# Patient Record
Sex: FEMALE | Race: WHITE | NOT HISPANIC OR LATINO | Employment: FULL TIME | URBAN - METROPOLITAN AREA
[De-identification: names, ages, dates, MRNs, and addresses within clinical notes are randomized per-mention and may not be internally consistent; named-entity substitution may affect disease eponyms.]

---

## 2020-04-08 ENCOUNTER — TELEPHONE (OUTPATIENT)
Dept: RHEUMATOLOGY | Facility: CLINIC | Age: 70
End: 2020-04-08

## 2020-04-08 NOTE — TELEPHONE ENCOUNTER
----- Message from Bhakti Edgar sent at 4/8/2020  2:33 PM CDT -----  Contact: Patient  Type: Needs Medical Advice  Who Called:  Patient  Symptoms (please be specific):  lupus  Best Call Back Number:   Additional Information: Would be a new patient, switching providers. Patient has lupus and is wanting to schedule a new patient appt..

## 2020-04-08 NOTE — TELEPHONE ENCOUNTER
Returned patient call regarding new patient appointment. Informed of first new patient appointment. Patient would like to schedule and be added to wait list. Patient aware of date and time of appointment.

## 2020-12-10 ENCOUNTER — TELEPHONE (OUTPATIENT)
Dept: RHEUMATOLOGY | Facility: CLINIC | Age: 70
End: 2020-12-10

## 2020-12-10 NOTE — TELEPHONE ENCOUNTER
----- Message from Alvin Subramanian sent at 12/10/2020  3:52 PM CST -----  Type:  Patient Returning Call    Who Called:  Patient  Who Left Message for Patient:  NA  Does the patient know what this is regarding?: ROMANA  Best Call Back Number: [phone]  Additional Information:

## 2020-12-15 ENCOUNTER — TELEPHONE (OUTPATIENT)
Dept: RHEUMATOLOGY | Facility: CLINIC | Age: 70
End: 2020-12-15

## 2020-12-15 ENCOUNTER — OFFICE VISIT (OUTPATIENT)
Dept: RHEUMATOLOGY | Facility: CLINIC | Age: 70
End: 2020-12-15
Payer: COMMERCIAL

## 2020-12-15 VITALS — WEIGHT: 154 LBS | DIASTOLIC BLOOD PRESSURE: 77 MMHG | SYSTOLIC BLOOD PRESSURE: 115 MMHG | HEART RATE: 90 BPM

## 2020-12-15 DIAGNOSIS — L93.1 SUBACUTE CUTANEOUS LUPUS ERYTHEMATOSUS: ICD-10-CM

## 2020-12-15 DIAGNOSIS — M32.19 SYSTEMIC LUPUS ERYTHEMATOSUS WITH OTHER ORGAN INVOLVEMENT, UNSPECIFIED SLE TYPE: Primary | ICD-10-CM

## 2020-12-15 DIAGNOSIS — A31.0 MYCOBACTERIUM AVIUM-INTRACELLULARE INFECTION: ICD-10-CM

## 2020-12-15 DIAGNOSIS — R06.02 SOB (SHORTNESS OF BREATH): ICD-10-CM

## 2020-12-15 PROCEDURE — 99205 PR OFFICE/OUTPT VISIT, NEW, LEVL V, 60-74 MIN: ICD-10-PCS | Mod: 25,S$GLB,, | Performed by: INTERNAL MEDICINE

## 2020-12-15 PROCEDURE — 1159F MED LIST DOCD IN RCRD: CPT | Mod: S$GLB,,, | Performed by: INTERNAL MEDICINE

## 2020-12-15 PROCEDURE — 99205 OFFICE O/P NEW HI 60 MIN: CPT | Mod: 25,S$GLB,, | Performed by: INTERNAL MEDICINE

## 2020-12-15 PROCEDURE — 99999 PR PBB SHADOW E&M-EST. PATIENT-LVL III: ICD-10-PCS | Mod: PBBFAC,,, | Performed by: INTERNAL MEDICINE

## 2020-12-15 PROCEDURE — 1159F PR MEDICATION LIST DOCUMENTED IN MEDICAL RECORD: ICD-10-PCS | Mod: S$GLB,,, | Performed by: INTERNAL MEDICINE

## 2020-12-15 PROCEDURE — 99999 PR PBB SHADOW E&M-EST. PATIENT-LVL III: CPT | Mod: PBBFAC,,, | Performed by: INTERNAL MEDICINE

## 2020-12-15 RX ORDER — FLUCONAZOLE 150 MG/1
150 TABLET ORAL DAILY
Qty: 3 TABLET | Refills: 3 | Status: SHIPPED | OUTPATIENT
Start: 2020-12-15 | End: 2020-12-18

## 2020-12-15 ASSESSMENT — ROUTINE ASSESSMENT OF PATIENT INDEX DATA (RAPID3)
PATIENT GLOBAL ASSESSMENT SCORE: 5
FATIGUE SCORE: 1.1
TOTAL RAPID3 SCORE: 2.28
PAIN SCORE: 0.5
PSYCHOLOGICAL DISTRESS SCORE: 0
MDHAQ FUNCTION SCORE: 0.4

## 2020-12-15 NOTE — TELEPHONE ENCOUNTER
----- Message from Jewels Levi sent at 12/15/2020  1:04 PM CST -----  Regarding: Switch [facility]  Type: Needs Medical Advice  Who Called:  Pt    Best Call Back Number: [phone]  Additional Information: Patient is requesting a call back in regards to switching lab to [facility] in [place] Please and Thank you!

## 2020-12-15 NOTE — PROGRESS NOTES
Subjective:       Patient ID: Edwina Mcdaniels is a 70 y.o. female.    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    Review of Systems   Constitutional: Positive for fatigue. Negative for activity change, appetite change, chills, diaphoresis, fever and unexpected weight change.   HENT: Negative for congestion, dental problem, ear discharge, ear pain, facial swelling, mouth sores, nosebleeds, postnasal drip, rhinorrhea, sinus pressure, sneezing, sore throat, tinnitus, trouble swallowing and voice change.    Eyes: Negative for photophobia, pain, discharge, redness and itching.   Respiratory: Negative for apnea, cough, chest tightness, shortness of breath and wheezing.    Cardiovascular: Negative for chest pain, palpitations and leg swelling.   Gastrointestinal: Negative for abdominal distention, abdominal pain, constipation, diarrhea, nausea and vomiting.   Endocrine: Negative for cold intolerance, heat intolerance, polydipsia and polyuria.   Genitourinary: Negative for decreased urine volume, difficulty urinating, dysuria, flank pain, frequency, hematuria and urgency.   Musculoskeletal: Negative for back pain, gait problem, joint swelling, myalgias, neck pain and neck stiffness.   Skin: Negative for pallor, rash and wound.   Allergic/Immunologic: Negative for immunocompromised state.   Neurological: Positive for weakness. Negative for dizziness, tremors, numbness and headaches.   Hematological: Negative for adenopathy. Does not bruise/bleed easily.   Psychiatric/Behavioral: Negative for sleep disturbance. The patient is not nervous/anxious.          Objective:   /77 (BP Location: Left arm, Patient Position: Sitting, BP Method: Medium (Automatic))   Pulse 90   Wt 69.9 kg (154 lb)      Physical  Exam   Nursing note and vitals reviewed.  Constitutional: She is oriented to person, place, and time and well-developed, well-nourished, and in no distress. No distress.   HENT:   Head: Normocephalic and atraumatic.   Mouth/Throat: Oropharynx is clear and moist.   Eyes: EOM are normal. Pupils are equal, round, and reactive to light.   Neck: Neck supple. No thyromegaly present.   Cardiovascular: Normal rate, regular rhythm and normal heart sounds.  Exam reveals no gallop and no friction rub.    No murmur heard.  Pulmonary/Chest: Breath sounds normal. She has no wheezes. She has no rales. She exhibits no tenderness.   Abdominal: There is no abdominal tenderness. There is no rebound and no guarding.       Right Side Rheumatological Exam     Examination finds the elbow, 1st MCP, 2nd MCP, 3rd MCP, 4th MCP and 5th MCP normal.    The patient is tender to palpation of the 1st PIP, 1st MCP, 2nd PIP, 2nd MCP, 3rd PIP, 3rd MCP, 4th PIP, 4th MCP, 5th PIP and 5th MCP    She has swelling of the knee, 1st PIP, 1st MCP, 2nd PIP, 2nd MCP, 3rd PIP, 3rd MCP, 4th PIP, 4th MCP, 5th PIP and 5th MCP    The patient has an enlarged wrist, 1st PIP, 2nd PIP, 3rd PIP, 4th PIP and 5th PIP    Shoulder Exam   Tenderness Location: biceps tendon and clavicle    Range of Motion   Active abduction: abnormal   Adduction: abnormal  Sensation: normal    Knee Exam   Tenderness Location: medial joint line  Patellofemoral Crepitus: positive  Effusion: negative  Sensation: normal    Hip Exam   Tenderness Location: posterior, greater trochanter and lateral  Sensation: normal    Elbow/Wrist Exam   Tenderness Location: lateral epicondyle and medial epicondyle  Sensation: normal    Foot Exam   Right foot exam exhibits signs of inflamed dorsum  Right foot exam exhibits signs of no podagra, no tophus and no plantar fasciitis    Muscle Strength (0-5 scale):  : 4/5     Left Side Rheumatological Exam     Examination finds the elbow, knee, 1st MCP, 2nd MCP, 3rd  MCP, 4th MCP and 5th MCP normal.    The patient is tender to palpation of the 1st PIP, 1st MCP, 2nd PIP, 2nd MCP, 3rd PIP, 3rd MCP, 4th PIP, 4th MCP, 5th PIP and 5th MCP.    She has swelling of the 1st PIP, 1st MCP, 2nd PIP, 2nd MCP, 3rd PIP, 3rd MCP, 4th PIP, 4th MCP, 5th PIP and 5th MCP    The patient has an enlarged wrist, 1st PIP, 2nd PIP, 3rd PIP, 4th PIP and 5th PIP.    Shoulder Exam   Tenderness Location: biceps tendon and clavicle    Range of Motion   Active abduction: abnormal   Sensation: normal    Knee Exam   Tenderness Location: lateral joint line and medial joint line    Patellofemoral Crepitus: positive  Effusion: negative  Sensation: normal    Hip Exam   Tenderness Location: posterior, greater trochanter and lateral  Sensation: normal    Elbow/Wrist Exam   Tenderness Location: lateral epicondyle and medial epicondyle  Sensation: normal    Foot Exam   Left foot exam exhibits signs of inflamed dorsum  Left foot exam exhibits signs of no podagra and no plantar fasciitis    Muscle Strength (0-5 scale):  :  4/5       Back/Neck Exam   General Inspection   Gait: normal       Tenderness Right paramedian tenderness of the Occ, Upper C-Spine, Lower L-Spine and SI Joint.Left paramedian tenderness of the Occ, Upper C-Spine, Lower L-Spine and SI Joint.      Comments:  15 out of 18 tender points    Lymphadenopathy:     She has no cervical adenopathy.   Neurological: She is alert and oriented to person, place, and time. She has normal sensation and normal strength. Gait normal.   Reflex Scores:       Patellar reflexes are 3+ on the right side and 3+ on the left side.  Skin: No rash noted. No erythema. No pallor.     Psychiatric: Mood and affect normal.   Musculoskeletal: No tenderness or edema.           Component Name Value Ref Range   Micro Culture Result Culture in progress (A)     Micro Culture Result Acid-fast Bacilli isolated. ID to follow (A)     Micro Culture Result CRITICAL VALUE CALLED TO AND READ BACK  BY: Levar Vance MA 11/13/2020 (A)     Micro Culture Result 08:35 SRV. (A)     Micro Culture Result Susceptibilities to follow.     Micro Culture Result Test performed at: [facility].     Micro Culture Result [place], [place]     Micro Culture Result See media tab for scanned [facility] report.     AFB Direct Smear Result No acid fast bacillus seen.     AFB Concentrated Smear Result No acid fast bacillus seen.     AFB Concentrated Smear Result Test performed at: [facility]     AFB Concentrated Smear Result [place], [place]     AFB Culture Result Mycobacterium avium-intracellulare complex (A)     Micro Culture Result Susceptibilities to follow.  Test performed at: [facility].  [place], [place]  See media tab for scanned [facility] report.     Specimen Collected on   Specimen from lung obtained by bronchial washing procedure (specimen) - Specimen from lung obtain... 10/30/2020 11:04 AM      Ref Range & Units 5mo ago   NEIDA Screen, IFA NEGATIVE  POSITIVEAbnormal     Comment: NEIDA IFA is a first line screen for detecting the   presence of up to approximately 150 autoantibodies in   various autoimmune diseases. A positive NEIDA IFA result   is suggestive of autoimmune disease and reflexes to   titer and pattern. Further laboratory testing may be   considered if clinically indicated.     For additional information, please refer to   http://education.[employer]/faq/[email or web address]   (This link is being provided for informational/   educational purposes only.)        NEIDA Titer  titer 1:40High     Comment: A low level NEIDA titer may be present in pre-clinical   autoimmune diseases and normal individuals.                   Reference Range                   <1:40        Negative                   1:40-1:80    Low Antibody Level                   >1:80        Elevated Antibody Level   NEIDA Pattern   Nuclear, HomogeneousAbnormal     Comment: Homogeneous pattern is associated with  systemic lupus   erythematosus (SLE), drug-induced lupus and juvenile   idiopathic arthritis.     AC-1: Homogeneous     International Consensus on NEIDA Patterns   (https://doi.org/10.1515/fthg-2878-8173)   Resulting Agency  [facility]   Specimen Collected: 07/10/20 10:23 Last Resulted: 07/17/20 02:12   Received From: [facility]  Result Received: 12/15/20 09:11    Received Information     [id number] ago Test Requested   see below Comment: PNEUMOCYSTIS CARINII SMEAR Source   RML P. Carinii DFA   NOT DETECTED Comment: REFERENCE RANGE: NOT DETECTED   [id number] ago Test Requested   see below Comment: RESPIRATORY VIRUS PCR PANEL I Source   RML RSV RNA Qual PCR   NOT DETECTED Influ A RNA, PCR   NOT DETECTED Influ B RNA, PCR   NOT DETECTED Parainflu 1 RNA   NOT DETECTED Parainflu 2 RNA   NOT DETECTED Parainflu 3 RNA   NOT DETECTED Comment: PARAINFLU. 4 RNA         NOT DETECTED Adenovirus Detection by PCR   NOT DETECTED   [id number] ago SARS CoV 2 Ab (IgG)   NEGATIVE Comment:   Reference range: Negative       Assessment:       1. Systemic lupus erythematosus with other organ involvement, unspecified SLE type    2. Mycobacterium avium-intracellulare infection    3. SOB (shortness of breath)    4. Subacute cutaneous lupus erythematosus            Plan:         Edwina was seen today for disease management, pain and swelling.    Diagnoses and all orders for this visit:    Systemic lupus erythematosus with other organ involvement, unspecified SLE type  -     Cancel: NEIDA Screen w/Reflex; Future  -     Cancel: Anti Sm/RNP Antibody; Future  -     Cancel: Anti-DNA Ab, Double-Stranded; Future  -     Cancel: Anti-Histone Antibody; Future  -     Cancel: Anti-Scleroderma Antibody; Future  -     Cancel: Anti-Thyroglobulin Antibody; Future  -     Cancel: IgA; Future  -     Cancel: IgE; Future  -     Cancel: IgG; Future  -     Cancel: IgG 1, 2, 3, and 4; Future  -     Cancel: IgM; Future  -     Cancel: Humoral Immune Eval  (Pneumo Serotypes) With H. Flu; Future  -     Cancel: Sedimentation rate; Future  -     Cancel: Sjogrens syndrome-A extractable nuclear antibody; Future  -     Cancel: Sjogrens syndrome-B extractable nuclear antibody; Future  -     Cancel: TSH; Future  -     Cancel: Thyroid Peroxidase Antibody; Future  -     Cancel: T4, Free; Future  -     Cancel: T3, Free; Future  -     Cancel: Angiotensin Converting Enzyme; Future  -     Cancel: Lymphocyte Profile II; Future  -     fluconazole (DIFLUCAN) 150 MG Tab; Take 1 tablet (150 mg total) by mouth once daily. for 3 days  -     Lymphocyte Profile II; Future  -     Angiotensin Converting Enzyme; Future  -     T3, Free; Future  -     T4, Free; Future  -     Thyroid Peroxidase Antibody; Future  -     TSH; Future  -     Sjogrens syndrome-B extractable nuclear antibody; Future  -     Sjogrens syndrome-A extractable nuclear antibody; Future  -     IgM; Future  -     Humoral Immune Eval (Pneumo Serotypes) With H. Flu; Future  -     Sedimentation rate; Future  -     IgG 1, 2, 3, and 4; Future  -     IgG; Future  -     IgE; Future  -     IgA; Future  -     Anti-Thyroglobulin Antibody; Future  -     Anti-Scleroderma Antibody; Future  -     Anti-Histone Antibody; Future  -     Anti-DNA Ab, Double-Stranded; Future  -     Anti Sm/RNP Antibody; Future  -     NEIDA Screen w/Reflex; Future  -     Lymphocyte Profile II  -     Angiotensin Converting Enzyme  -     T3, Free  -     T4, Free  -     Thyroid Peroxidase Antibody  -     TSH  -     Sjogrens syndrome-B extractable nuclear antibody  -     Sjogrens syndrome-A extractable nuclear antibody  -     IgM  -     Humoral Immune Eval (Pneumo Serotypes) With H. Flu  -     Sedimentation rate  -     IgG 1, 2, 3, and 4  -     IgG  -     IgE  -     IgA  -     Anti-Thyroglobulin Antibody  -     Anti-Scleroderma Antibody  -     Anti-Histone Antibody  -     Anti-DNA Ab, Double-Stranded  -     Anti Sm/RNP Antibody  -     NEIDA Screen w/Reflex    Mycobacterium  avium-intracellulare infection  -     Cancel: NEIDA Screen w/Reflex; Future  -     Cancel: Anti Sm/RNP Antibody; Future  -     Cancel: Anti-DNA Ab, Double-Stranded; Future  -     Cancel: Anti-Histone Antibody; Future  -     Cancel: Anti-Scleroderma Antibody; Future  -     Cancel: Anti-Thyroglobulin Antibody; Future  -     Cancel: IgA; Future  -     Cancel: IgE; Future  -     Cancel: IgG; Future  -     Cancel: IgG 1, 2, 3, and 4; Future  -     Cancel: IgM; Future  -     Cancel: Humoral Immune Eval (Pneumo Serotypes) With H. Flu; Future  -     Cancel: Sedimentation rate; Future  -     Cancel: Sjogrens syndrome-A extractable nuclear antibody; Future  -     Cancel: Sjogrens syndrome-B extractable nuclear antibody; Future  -     Cancel: TSH; Future  -     Cancel: Thyroid Peroxidase Antibody; Future  -     Cancel: T4, Free; Future  -     Cancel: T3, Free; Future  -     Cancel: Angiotensin Converting Enzyme; Future  -     Cancel: Lymphocyte Profile II; Future  -     Lymphocyte Profile II; Future  -     Angiotensin Converting Enzyme; Future  -     T3, Free; Future  -     T4, Free; Future  -     Thyroid Peroxidase Antibody; Future  -     TSH; Future  -     Sjogrens syndrome-B extractable nuclear antibody; Future  -     Sjogrens syndrome-A extractable nuclear antibody; Future  -     IgM; Future  -     Humoral Immune Eval (Pneumo Serotypes) With H. Flu; Future  -     Sedimentation rate; Future  -     IgG 1, 2, 3, and 4; Future  -     IgG; Future  -     IgE; Future  -     IgA; Future  -     Anti-Thyroglobulin Antibody; Future  -     Anti-Scleroderma Antibody; Future  -     Anti-Histone Antibody; Future  -     Anti-DNA Ab, Double-Stranded; Future  -     Anti Sm/RNP Antibody; Future  -     NEIDA Screen w/Reflex; Future  -     Lymphocyte Profile II  -     Angiotensin Converting Enzyme  -     T3, Free  -     T4, Free  -     Thyroid Peroxidase Antibody  -     TSH  -     Sjogrens syndrome-B extractable nuclear antibody  -     Sjogrens  syndrome-A extractable nuclear antibody  -     IgM  -     Humoral Immune Eval (Pneumo Serotypes) With H. Flu  -     Sedimentation rate  -     IgG 1, 2, 3, and 4  -     IgG  -     IgE  -     IgA  -     Anti-Thyroglobulin Antibody  -     Anti-Scleroderma Antibody  -     Anti-Histone Antibody  -     Anti-DNA Ab, Double-Stranded  -     Anti Sm/RNP Antibody  -     NEIDA Screen w/Reflex    SOB (shortness of breath)  -     Cancel: NEIDA Screen w/Reflex; Future  -     Cancel: Anti Sm/RNP Antibody; Future  -     Cancel: Anti-DNA Ab, Double-Stranded; Future  -     Cancel: Anti-Histone Antibody; Future  -     Cancel: Anti-Scleroderma Antibody; Future  -     Cancel: Anti-Thyroglobulin Antibody; Future  -     Cancel: IgA; Future  -     Cancel: IgE; Future  -     Cancel: IgG; Future  -     Cancel: IgG 1, 2, 3, and 4; Future  -     Cancel: IgM; Future  -     Cancel: Humoral Immune Eval (Pneumo Serotypes) With H. Flu; Future  -     Cancel: Sedimentation rate; Future  -     Cancel: Sjogrens syndrome-A extractable nuclear antibody; Future  -     Cancel: Sjogrens syndrome-B extractable nuclear antibody; Future  -     Cancel: TSH; Future  -     Cancel: Thyroid Peroxidase Antibody; Future  -     Cancel: T4, Free; Future  -     Cancel: T3, Free; Future  -     Cancel: Angiotensin Converting Enzyme; Future  -     Cancel: Lymphocyte Profile II; Future  -     Lymphocyte Profile II; Future  -     Angiotensin Converting Enzyme; Future  -     T3, Free; Future  -     T4, Free; Future  -     Thyroid Peroxidase Antibody; Future  -     TSH; Future  -     Sjogrens syndrome-B extractable nuclear antibody; Future  -     Sjogrens syndrome-A extractable nuclear antibody; Future  -     IgM; Future  -     Humoral Immune Eval (Pneumo Serotypes) With H. Flu; Future  -     Sedimentation rate; Future  -     IgG 1, 2, 3, and 4; Future  -     IgG; Future  -     IgE; Future  -     IgA; Future  -     Anti-Thyroglobulin Antibody; Future  -     Anti-Scleroderma  Antibody; Future  -     Anti-Histone Antibody; Future  -     Anti-DNA Ab, Double-Stranded; Future  -     Anti Sm/RNP Antibody; Future  -     NEIDA Screen w/Reflex; Future  -     Lymphocyte Profile II  -     Angiotensin Converting Enzyme  -     T3, Free  -     T4, Free  -     Thyroid Peroxidase Antibody  -     TSH  -     Sjogrens syndrome-B extractable nuclear antibody  -     Sjogrens syndrome-A extractable nuclear antibody  -     IgM  -     Humoral Immune Eval (Pneumo Serotypes) With H. Flu  -     Sedimentation rate  -     IgG 1, 2, 3, and 4  -     IgG  -     IgE  -     IgA  -     Anti-Thyroglobulin Antibody  -     Anti-Scleroderma Antibody  -     Anti-Histone Antibody  -     Anti-DNA Ab, Double-Stranded  -     Anti Sm/RNP Antibody  -     NEIDA Screen w/Reflex    Subacute cutaneous lupus erythematosus  -     Cancel: NEIDA Screen w/Reflex; Future  -     Cancel: Anti Sm/RNP Antibody; Future  -     Cancel: Anti-DNA Ab, Double-Stranded; Future  -     Cancel: Anti-Histone Antibody; Future  -     Cancel: Anti-Scleroderma Antibody; Future  -     Cancel: Anti-Thyroglobulin Antibody; Future  -     Cancel: IgA; Future  -     Cancel: IgE; Future  -     Cancel: IgG; Future  -     Cancel: IgG 1, 2, 3, and 4; Future  -     Cancel: IgM; Future  -     Cancel: Humoral Immune Eval (Pneumo Serotypes) With H. Flu; Future  -     Cancel: Sedimentation rate; Future  -     Cancel: Sjogrens syndrome-A extractable nuclear antibody; Future  -     Cancel: Sjogrens syndrome-B extractable nuclear antibody; Future  -     Cancel: TSH; Future  -     Cancel: Thyroid Peroxidase Antibody; Future  -     Cancel: T4, Free; Future  -     Cancel: T3, Free; Future  -     Cancel: Angiotensin Converting Enzyme; Future  -     Cancel: Lymphocyte Profile II; Future  -     Lymphocyte Profile II; Future  -     Angiotensin Converting Enzyme; Future  -     T3, Free; Future  -     T4, Free; Future  -     Thyroid Peroxidase Antibody; Future  -     TSH; Future  -      Sjogrens syndrome-B extractable nuclear antibody; Future  -     Sjogrens syndrome-A extractable nuclear antibody; Future  -     IgM; Future  -     Humoral Immune Eval (Pneumo Serotypes) With H. Flu; Future  -     Sedimentation rate; Future  -     IgG 1, 2, 3, and 4; Future  -     IgG; Future  -     IgE; Future  -     IgA; Future  -     Anti-Thyroglobulin Antibody; Future  -     Anti-Scleroderma Antibody; Future  -     Anti-Histone Antibody; Future  -     Anti-DNA Ab, Double-Stranded; Future  -     Anti Sm/RNP Antibody; Future  -     NEIDA Screen w/Reflex; Future  -     Lymphocyte Profile II  -     Angiotensin Converting Enzyme  -     T3, Free  -     T4, Free  -     Thyroid Peroxidase Antibody  -     TSH  -     Sjogrens syndrome-B extractable nuclear antibody  -     Sjogrens syndrome-A extractable nuclear antibody  -     IgM  -     Humoral Immune Eval (Pneumo Serotypes) With H. Flu  -     Sedimentation rate  -     IgG 1, 2, 3, and 4  -     IgG  -     IgE  -     IgA  -     Anti-Thyroglobulin Antibody  -     Anti-Scleroderma Antibody  -     Anti-Histone Antibody  -     Anti-DNA Ab, Double-Stranded  -     Anti Sm/RNP Antibody  -     NEIDA Screen w/Reflex      More than 50% of the 60 minute encounter was spent face to face counseling the patient regarding current status and future plan of care as well as side of the medications. All questions were answered to patient's satisfaction

## 2020-12-15 NOTE — TELEPHONE ENCOUNTER
----- Message from Barbara Looney sent at 12/15/2020  3:56 PM CST -----  Regarding: Patient Call Back  Contact: Patient  Type: Patient Call Back    Who called:Patient     What is the request in detail: Pt is requesting that her orders be sent [facility]. Pt states that the previous place is for covid testing    Can the clinic reply by [facility]?    Would the patient rather a call back or a response via [facility]? Call back       Best call back number: [phone]

## 2020-12-15 NOTE — PATIENT INSTRUCTIONS
Component Name Value Ref Range   Micro Culture Result Culture in progress (A)     Micro Culture Result Acid-fast Bacilli isolated. ID to follow (A)     Micro Culture Result CRITICAL VALUE CALLED TO AND READ BACK BY: Levar Vance MA 11/13/2020 (A)     Micro Culture Result 08:35 SRV. (A)     Micro Culture Result Susceptibilities to follow.     Micro Culture Result Test performed at: [facility]     Micro Culture Result [place], [place]     Micro Culture Result See media tab for scanned [facility] report.     AFB Direct Smear Result No acid fast bacillus seen.     AFB Concentrated Smear Result No acid fast bacillus seen.     AFB Concentrated Smear Result Test performed at: [facility]     AFB Concentrated Smear Result [place], [place]     AFB Culture Result Mycobacterium avium-intracellulare complex (A)     Micro Culture Result Susceptibilities to follow.  Test performed at: [facility]  [place], [place]  See media tab for scanned [facility] report.     Specimen Collected on   Specimen from lung obtained by bronchial washing procedure (specimen) - Specimen from lung obtain... 10/30/2020 11:04 AM

## 2020-12-15 NOTE — TELEPHONE ENCOUNTER
Returned patient call and faxed lab orders to [facility] in [place]. Patient will go tomorrow to have done.

## 2020-12-15 NOTE — TELEPHONE ENCOUNTER
Returned patient call. Stated she is not going back to the [facility] in [place]. Facility was disgusting. Requesting lab orders be faxed to clinical labs at [phone].

## 2020-12-23 ENCOUNTER — PATIENT MESSAGE (OUTPATIENT)
Dept: RHEUMATOLOGY | Facility: CLINIC | Age: 70
End: 2020-12-23

## 2020-12-23 NOTE — TELEPHONE ENCOUNTER
Patient messaged in inquiring about when her lab results will be uploaded to [facility]. She had them done outside, so informed her they won't automatically be there just yet.

## 2021-01-04 ENCOUNTER — PATIENT MESSAGE (OUTPATIENT)
Dept: RHEUMATOLOGY | Facility: CLINIC | Age: 71
End: 2021-01-04

## 2021-01-13 ENCOUNTER — IMMUNIZATION (OUTPATIENT)
Dept: FAMILY MEDICINE | Facility: CLINIC | Age: 71
End: 2021-01-13
Payer: COMMERCIAL

## 2021-01-13 DIAGNOSIS — Z23 NEED FOR VACCINATION: ICD-10-CM

## 2021-01-13 PROCEDURE — 91300 COVID-19, MRNA, LNP-S, PF, 30 MCG/0.3 ML DOSE VACCINE: CPT | Mod: PBBFAC,PO

## 2021-02-03 ENCOUNTER — IMMUNIZATION (OUTPATIENT)
Dept: FAMILY MEDICINE | Facility: CLINIC | Age: 71
End: 2021-02-03
Payer: COMMERCIAL

## 2021-02-03 DIAGNOSIS — Z23 NEED FOR VACCINATION: Primary | ICD-10-CM

## 2021-02-03 PROCEDURE — 0002A COVID-19, MRNA, LNP-S, PF, 30 MCG/0.3 ML DOSE VACCINE: CPT | Mod: PBBFAC | Performed by: FAMILY MEDICINE

## 2021-02-03 PROCEDURE — 91300 COVID-19, MRNA, LNP-S, PF, 30 MCG/0.3 ML DOSE VACCINE: CPT | Mod: PBBFAC | Performed by: FAMILY MEDICINE

## 2021-02-22 ENCOUNTER — OFFICE VISIT (OUTPATIENT)
Dept: RHEUMATOLOGY | Facility: CLINIC | Age: 71
End: 2021-02-22
Payer: COMMERCIAL

## 2021-02-22 VITALS
HEART RATE: 75 BPM | HEIGHT: 66 IN | SYSTOLIC BLOOD PRESSURE: 110 MMHG | DIASTOLIC BLOOD PRESSURE: 71 MMHG | BODY MASS INDEX: 25.07 KG/M2 | WEIGHT: 156 LBS

## 2021-02-22 DIAGNOSIS — M32.19 SYSTEMIC LUPUS ERYTHEMATOSUS WITH OTHER ORGAN INVOLVEMENT, UNSPECIFIED SLE TYPE: Primary | ICD-10-CM

## 2021-02-22 DIAGNOSIS — R06.02 SOB (SHORTNESS OF BREATH): ICD-10-CM

## 2021-02-22 DIAGNOSIS — A31.0 MYCOBACTERIUM AVIUM-INTRACELLULARE INFECTION: ICD-10-CM

## 2021-02-22 PROCEDURE — 3008F PR BODY MASS INDEX (BMI) DOCUMENTED: ICD-10-PCS | Mod: CPTII,S$GLB,, | Performed by: INTERNAL MEDICINE

## 2021-02-22 PROCEDURE — 99215 PR OFFICE/OUTPT VISIT, EST, LEVL V, 40-54 MIN: ICD-10-PCS | Mod: S$GLB,,, | Performed by: INTERNAL MEDICINE

## 2021-02-22 PROCEDURE — 99999 PR PBB SHADOW E&M-EST. PATIENT-LVL III: ICD-10-PCS | Mod: PBBFAC,,, | Performed by: INTERNAL MEDICINE

## 2021-02-22 PROCEDURE — 1125F AMNT PAIN NOTED PAIN PRSNT: CPT | Mod: S$GLB,,, | Performed by: INTERNAL MEDICINE

## 2021-02-22 PROCEDURE — 3008F BODY MASS INDEX DOCD: CPT | Mod: CPTII,S$GLB,, | Performed by: INTERNAL MEDICINE

## 2021-02-22 PROCEDURE — 3288F FALL RISK ASSESSMENT DOCD: CPT | Mod: CPTII,S$GLB,, | Performed by: INTERNAL MEDICINE

## 2021-02-22 PROCEDURE — 1159F PR MEDICATION LIST DOCUMENTED IN MEDICAL RECORD: ICD-10-PCS | Mod: S$GLB,,, | Performed by: INTERNAL MEDICINE

## 2021-02-22 PROCEDURE — 1125F PR PAIN SEVERITY QUANTIFIED, PAIN PRESENT: ICD-10-PCS | Mod: S$GLB,,, | Performed by: INTERNAL MEDICINE

## 2021-02-22 PROCEDURE — 1159F MED LIST DOCD IN RCRD: CPT | Mod: S$GLB,,, | Performed by: INTERNAL MEDICINE

## 2021-02-22 PROCEDURE — 1101F PT FALLS ASSESS-DOCD LE1/YR: CPT | Mod: CPTII,S$GLB,, | Performed by: INTERNAL MEDICINE

## 2021-02-22 PROCEDURE — 3288F PR FALLS RISK ASSESSMENT DOCUMENTED: ICD-10-PCS | Mod: CPTII,S$GLB,, | Performed by: INTERNAL MEDICINE

## 2021-02-22 PROCEDURE — 99215 OFFICE O/P EST HI 40 MIN: CPT | Mod: S$GLB,,, | Performed by: INTERNAL MEDICINE

## 2021-02-22 PROCEDURE — 1101F PR PT FALLS ASSESS DOC 0-1 FALLS W/OUT INJ PAST YR: ICD-10-PCS | Mod: CPTII,S$GLB,, | Performed by: INTERNAL MEDICINE

## 2021-02-22 PROCEDURE — 99999 PR PBB SHADOW E&M-EST. PATIENT-LVL III: CPT | Mod: PBBFAC,,, | Performed by: INTERNAL MEDICINE

## 2021-02-22 RX ORDER — ASCORBIC ACID 500 MG
500 TABLET ORAL DAILY
COMMUNITY

## 2021-02-22 RX ORDER — EPINEPHRINE 0.3 MG/.3ML
INJECTION SUBCUTANEOUS
COMMUNITY
Start: 2021-01-25

## 2021-02-22 RX ORDER — SODIUM CHLORIDE FOR INHALATION 3 %
VIAL, NEBULIZER (ML) INHALATION
COMMUNITY
Start: 2021-02-19

## 2021-02-22 RX ORDER — ETHAMBUTOL HYDROCHLORIDE 400 MG/1
400 TABLET, FILM COATED ORAL DAILY
COMMUNITY
Start: 2021-02-15

## 2021-02-22 RX ORDER — RIFAMPIN 300 MG/1
300 CAPSULE ORAL DAILY
COMMUNITY
Start: 2021-02-15

## 2021-02-22 RX ORDER — FLUCONAZOLE 150 MG/1
150 TABLET ORAL
COMMUNITY
Start: 2021-01-21

## 2021-02-22 RX ORDER — DICLOFENAC SODIUM 10 MG/G
2 GEL TOPICAL 4 TIMES DAILY
Qty: 100 G | Refills: 3 | Status: SHIPPED | OUTPATIENT
Start: 2021-02-22 | End: 2021-03-24

## 2021-02-22 RX ORDER — NYSTATIN 100000 [USP'U]/ML
SUSPENSION ORAL
COMMUNITY
Start: 2021-02-01

## 2021-02-22 RX ORDER — AZITHROMYCIN 500 MG/1
500 TABLET, FILM COATED ORAL DAILY
COMMUNITY
Start: 2021-02-15

## 2021-04-07 ENCOUNTER — TELEPHONE (OUTPATIENT)
Dept: RHEUMATOLOGY | Facility: CLINIC | Age: 71
End: 2021-04-07

## 2021-04-14 ENCOUNTER — PATIENT MESSAGE (OUTPATIENT)
Dept: RHEUMATOLOGY | Facility: CLINIC | Age: 71
End: 2021-04-14

## 2021-04-15 DIAGNOSIS — A31.0 MYCOBACTERIUM AVIUM-INTRACELLULARE INFECTION: ICD-10-CM

## 2021-04-15 DIAGNOSIS — M32.19 SYSTEMIC LUPUS ERYTHEMATOSUS WITH OTHER ORGAN INVOLVEMENT, UNSPECIFIED SLE TYPE: Primary | ICD-10-CM

## 2021-04-16 ENCOUNTER — LAB VISIT (OUTPATIENT)
Dept: LAB | Facility: HOSPITAL | Age: 71
End: 2021-04-16
Attending: INTERNAL MEDICINE
Payer: COMMERCIAL

## 2021-04-16 DIAGNOSIS — M32.19 SYSTEMIC LUPUS ERYTHEMATOSUS WITH OTHER ORGAN INVOLVEMENT, UNSPECIFIED SLE TYPE: ICD-10-CM

## 2021-04-16 LAB
CRP SERPL-MCNC: 18.3 MG/L (ref 0–8.2)
ERYTHROCYTE [SEDIMENTATION RATE] IN BLOOD BY WESTERGREN METHOD: 33 MM/HR (ref 0–20)

## 2021-04-16 PROCEDURE — 86235 NUCLEAR ANTIGEN ANTIBODY: CPT | Mod: 59 | Performed by: INTERNAL MEDICINE

## 2021-04-16 PROCEDURE — 36415 COLL VENOUS BLD VENIPUNCTURE: CPT | Mod: PO | Performed by: INTERNAL MEDICINE

## 2021-04-16 PROCEDURE — 86039 ANTINUCLEAR ANTIBODIES (ANA): CPT | Performed by: INTERNAL MEDICINE

## 2021-04-16 PROCEDURE — 85651 RBC SED RATE NONAUTOMATED: CPT | Mod: PO | Performed by: INTERNAL MEDICINE

## 2021-04-16 PROCEDURE — 86038 ANTINUCLEAR ANTIBODIES: CPT | Performed by: INTERNAL MEDICINE

## 2021-04-16 PROCEDURE — 86140 C-REACTIVE PROTEIN: CPT | Performed by: INTERNAL MEDICINE

## 2021-04-19 LAB
ANA PATTERN 1: NORMAL
ANA SER QL IF: POSITIVE
ANA TITR SER IF: NORMAL {TITER}

## 2021-04-21 LAB
ANTI SM ANTIBODY: 0.09 RATIO (ref 0–0.99)
ANTI SM/RNP ANTIBODY: 0.21 RATIO (ref 0–0.99)
ANTI-SM INTERPRETATION: NEGATIVE
ANTI-SM/RNP INTERPRETATION: NEGATIVE
ANTI-SSA ANTIBODY: 0.14 RATIO (ref 0–0.99)
ANTI-SSA INTERPRETATION: NEGATIVE
ANTI-SSB ANTIBODY: 0.05 RATIO (ref 0–0.99)
ANTI-SSB INTERPRETATION: NEGATIVE
DSDNA AB SER-ACNC: NORMAL [IU]/ML

## 2021-04-25 ENCOUNTER — PATIENT MESSAGE (OUTPATIENT)
Dept: RHEUMATOLOGY | Facility: CLINIC | Age: 71
End: 2021-04-25

## 2021-05-06 ENCOUNTER — TELEPHONE (OUTPATIENT)
Dept: RHEUMATOLOGY | Facility: CLINIC | Age: 71
End: 2021-05-06

## 2021-05-10 ENCOUNTER — PATIENT MESSAGE (OUTPATIENT)
Dept: RHEUMATOLOGY | Facility: CLINIC | Age: 71
End: 2021-05-10

## 2021-05-24 ENCOUNTER — PATIENT MESSAGE (OUTPATIENT)
Dept: RHEUMATOLOGY | Facility: CLINIC | Age: 71
End: 2021-05-24

## 2021-06-01 ENCOUNTER — TELEPHONE (OUTPATIENT)
Dept: RHEUMATOLOGY | Facility: CLINIC | Age: 71
End: 2021-06-01

## 2021-06-01 DIAGNOSIS — M32.19 SYSTEMIC LUPUS ERYTHEMATOSUS WITH OTHER ORGAN INVOLVEMENT, UNSPECIFIED SLE TYPE: Primary | ICD-10-CM

## 2021-06-01 DIAGNOSIS — A31.0 MYCOBACTERIUM AVIUM-INTRACELLULARE INFECTION: ICD-10-CM

## 2021-06-01 DIAGNOSIS — L93.1 SUBACUTE CUTANEOUS LUPUS ERYTHEMATOSUS: ICD-10-CM

## 2021-06-01 RX ORDER — HYDROXYCHLOROQUINE SULFATE 200 MG/1
200 TABLET, FILM COATED ORAL 2 TIMES DAILY
Qty: 60 TABLET | Refills: 5 | Status: SHIPPED | OUTPATIENT
Start: 2021-06-01 | End: 2021-12-01

## 2021-06-02 ENCOUNTER — OFFICE VISIT (OUTPATIENT)
Dept: RHEUMATOLOGY | Facility: CLINIC | Age: 71
End: 2021-06-02
Payer: COMMERCIAL

## 2021-06-02 DIAGNOSIS — L93.1 SUBACUTE CUTANEOUS LUPUS ERYTHEMATOSUS: ICD-10-CM

## 2021-06-02 DIAGNOSIS — A31.0 MYCOBACTERIUM AVIUM-INTRACELLULARE INFECTION: ICD-10-CM

## 2021-06-02 DIAGNOSIS — M32.19 SYSTEMIC LUPUS ERYTHEMATOSUS WITH OTHER ORGAN INVOLVEMENT, UNSPECIFIED SLE TYPE: Primary | ICD-10-CM

## 2021-06-02 PROCEDURE — 99214 PR OFFICE/OUTPT VISIT, EST, LEVL IV, 30-39 MIN: ICD-10-PCS | Mod: 95,,, | Performed by: INTERNAL MEDICINE

## 2021-06-02 PROCEDURE — 99214 OFFICE O/P EST MOD 30 MIN: CPT | Mod: 95,,, | Performed by: INTERNAL MEDICINE

## 2021-06-02 PROCEDURE — 1159F PR MEDICATION LIST DOCUMENTED IN MEDICAL RECORD: ICD-10-PCS | Mod: ,,, | Performed by: INTERNAL MEDICINE

## 2021-06-02 PROCEDURE — 1159F MED LIST DOCD IN RCRD: CPT | Mod: ,,, | Performed by: INTERNAL MEDICINE

## 2021-06-02 RX ORDER — PREDNISONE 5 MG/1
5 TABLET ORAL DAILY PRN
Qty: 30 TABLET | Refills: 1 | Status: SHIPPED | OUTPATIENT
Start: 2021-06-02 | End: 2021-07-02

## 2021-06-03 ENCOUNTER — TELEPHONE (OUTPATIENT)
Dept: RHEUMATOLOGY | Facility: CLINIC | Age: 71
End: 2021-06-03

## 2021-06-30 ENCOUNTER — LAB VISIT (OUTPATIENT)
Dept: LAB | Facility: HOSPITAL | Age: 71
End: 2021-06-30
Attending: INTERNAL MEDICINE
Payer: COMMERCIAL

## 2021-06-30 DIAGNOSIS — L93.1 SUBACUTE CUTANEOUS LUPUS ERYTHEMATOSUS: ICD-10-CM

## 2021-06-30 DIAGNOSIS — A31.0 MYCOBACTERIUM AVIUM-INTRACELLULARE INFECTION: ICD-10-CM

## 2021-06-30 DIAGNOSIS — M32.19 SYSTEMIC LUPUS ERYTHEMATOSUS WITH OTHER ORGAN INVOLVEMENT, UNSPECIFIED SLE TYPE: ICD-10-CM

## 2021-06-30 LAB
ALBUMIN SERPL BCP-MCNC: 3.8 G/DL (ref 3.5–5.2)
ALP SERPL-CCNC: 90 U/L (ref 55–135)
ALT SERPL W/O P-5'-P-CCNC: 35 U/L (ref 10–44)
ANION GAP SERPL CALC-SCNC: 10 MMOL/L (ref 8–16)
AST SERPL-CCNC: 28 U/L (ref 10–40)
BASOPHILS # BLD AUTO: 0.04 K/UL (ref 0–0.2)
BASOPHILS NFR BLD: 0.6 % (ref 0–1.9)
BILIRUB SERPL-MCNC: 0.3 MG/DL (ref 0.1–1)
BUN SERPL-MCNC: 16 MG/DL (ref 8–23)
CALCIUM SERPL-MCNC: 9.7 MG/DL (ref 8.7–10.5)
CHLORIDE SERPL-SCNC: 102 MMOL/L (ref 95–110)
CO2 SERPL-SCNC: 26 MMOL/L (ref 23–29)
CREAT SERPL-MCNC: 0.9 MG/DL (ref 0.5–1.4)
CRP SERPL-MCNC: 9.4 MG/L (ref 0–8.2)
DIFFERENTIAL METHOD: ABNORMAL
EOSINOPHIL # BLD AUTO: 0.1 K/UL (ref 0–0.5)
EOSINOPHIL NFR BLD: 1.8 % (ref 0–8)
ERYTHROCYTE [DISTWIDTH] IN BLOOD BY AUTOMATED COUNT: 13.3 % (ref 11.5–14.5)
ERYTHROCYTE [SEDIMENTATION RATE] IN BLOOD BY WESTERGREN METHOD: 27 MM/HR (ref 0–20)
EST. GFR  (AFRICAN AMERICAN): >60 ML/MIN/1.73 M^2
EST. GFR  (NON AFRICAN AMERICAN): >60 ML/MIN/1.73 M^2
GLUCOSE SERPL-MCNC: 87 MG/DL (ref 70–110)
HCT VFR BLD AUTO: 39.7 % (ref 37–48.5)
HGB BLD-MCNC: 12.6 G/DL (ref 12–16)
IMM GRANULOCYTES # BLD AUTO: 0.01 K/UL (ref 0–0.04)
IMM GRANULOCYTES NFR BLD AUTO: 0.2 % (ref 0–0.5)
LYMPHOCYTES # BLD AUTO: 2 K/UL (ref 1–4.8)
LYMPHOCYTES NFR BLD: 29.9 % (ref 18–48)
MCH RBC QN AUTO: 29 PG (ref 27–31)
MCHC RBC AUTO-ENTMCNC: 31.7 G/DL (ref 32–36)
MCV RBC AUTO: 91 FL (ref 82–98)
MONOCYTES # BLD AUTO: 0.6 K/UL (ref 0.3–1)
MONOCYTES NFR BLD: 9.3 % (ref 4–15)
NEUTROPHILS # BLD AUTO: 3.8 K/UL (ref 1.8–7.7)
NEUTROPHILS NFR BLD: 58.2 % (ref 38–73)
NRBC BLD-RTO: 0 /100 WBC
PLATELET # BLD AUTO: 255 K/UL (ref 150–450)
PMV BLD AUTO: 11.3 FL (ref 9.2–12.9)
POTASSIUM SERPL-SCNC: 4.3 MMOL/L (ref 3.5–5.1)
PROT SERPL-MCNC: 7.3 G/DL (ref 6–8.4)
RBC # BLD AUTO: 4.35 M/UL (ref 4–5.4)
SODIUM SERPL-SCNC: 138 MMOL/L (ref 136–145)
WBC # BLD AUTO: 6.56 K/UL (ref 3.9–12.7)

## 2021-06-30 PROCEDURE — 85025 COMPLETE CBC W/AUTO DIFF WBC: CPT | Performed by: INTERNAL MEDICINE

## 2021-06-30 PROCEDURE — 80053 COMPREHEN METABOLIC PANEL: CPT | Performed by: INTERNAL MEDICINE

## 2021-06-30 PROCEDURE — 36415 COLL VENOUS BLD VENIPUNCTURE: CPT | Mod: PO | Performed by: INTERNAL MEDICINE

## 2021-06-30 PROCEDURE — 85651 RBC SED RATE NONAUTOMATED: CPT | Mod: PO | Performed by: INTERNAL MEDICINE

## 2021-06-30 PROCEDURE — 86140 C-REACTIVE PROTEIN: CPT | Performed by: INTERNAL MEDICINE

## 2021-08-19 ENCOUNTER — IMMUNIZATION (OUTPATIENT)
Dept: FAMILY MEDICINE | Facility: CLINIC | Age: 71
End: 2021-08-19
Payer: COMMERCIAL

## 2021-08-19 DIAGNOSIS — Z23 NEED FOR VACCINATION: Primary | ICD-10-CM

## 2021-08-19 PROCEDURE — 0003A COVID-19, MRNA, LNP-S, PF, 30 MCG/0.3 ML DOSE VACCINE: CPT | Mod: CV19,,, | Performed by: INTERNAL MEDICINE

## 2021-08-19 PROCEDURE — 91300 COVID-19, MRNA, LNP-S, PF, 30 MCG/0.3 ML DOSE VACCINE: ICD-10-PCS | Mod: ,,, | Performed by: INTERNAL MEDICINE

## 2021-08-19 PROCEDURE — 91300 COVID-19, MRNA, LNP-S, PF, 30 MCG/0.3 ML DOSE VACCINE: CPT | Mod: ,,, | Performed by: INTERNAL MEDICINE

## 2021-08-19 PROCEDURE — 0003A COVID-19, MRNA, LNP-S, PF, 30 MCG/0.3 ML DOSE VACCINE: ICD-10-PCS | Mod: CV19,,, | Performed by: INTERNAL MEDICINE

## 2022-03-10 ENCOUNTER — PATIENT MESSAGE (OUTPATIENT)
Dept: RHEUMATOLOGY | Facility: CLINIC | Age: 72
End: 2022-03-10
Payer: COMMERCIAL

## 2022-07-25 ENCOUNTER — IMMUNIZATION (OUTPATIENT)
Dept: FAMILY MEDICINE | Facility: CLINIC | Age: 72
End: 2022-07-25
Payer: COMMERCIAL

## 2022-07-25 DIAGNOSIS — Z23 NEED FOR VACCINATION: Primary | ICD-10-CM

## 2022-07-25 PROCEDURE — 0054A COVID-19, MRNA, LNP-S, PF, 30 MCG/0.3 ML DOSE VACCINE (PFIZER): CPT | Mod: PBBFAC | Performed by: RADIOLOGY

## 2024-10-23 ENCOUNTER — PATIENT MESSAGE (OUTPATIENT)
Dept: GASTROENTEROLOGY | Facility: CLINIC | Age: 74
End: 2024-10-23
Payer: COMMERCIAL